# Patient Record
Sex: MALE | Race: OTHER | NOT HISPANIC OR LATINO | URBAN - METROPOLITAN AREA
[De-identification: names, ages, dates, MRNs, and addresses within clinical notes are randomized per-mention and may not be internally consistent; named-entity substitution may affect disease eponyms.]

---

## 2019-04-18 ENCOUNTER — OUTPATIENT (OUTPATIENT)
Dept: OUTPATIENT SERVICES | Facility: HOSPITAL | Age: 69
LOS: 1 days | End: 2019-04-18

## 2019-04-18 ENCOUNTER — APPOINTMENT (OUTPATIENT)
Age: 69
End: 2019-04-18

## 2019-04-18 ENCOUNTER — TRANSCRIPTION ENCOUNTER (OUTPATIENT)
Age: 69
End: 2019-04-18

## 2019-04-18 PROBLEM — Z00.00 ENCOUNTER FOR PREVENTIVE HEALTH EXAMINATION: Status: ACTIVE | Noted: 2019-04-18

## 2019-04-19 DIAGNOSIS — H40.003 PREGLAUCOMA, UNSPECIFIED, BILATERAL: ICD-10-CM

## 2019-04-19 DIAGNOSIS — H26.9 UNSPECIFIED CATARACT: ICD-10-CM

## 2019-08-13 ENCOUNTER — OUTPATIENT (OUTPATIENT)
Dept: OUTPATIENT SERVICES | Facility: HOSPITAL | Age: 69
LOS: 1 days | Discharge: HOME | End: 2019-08-13

## 2022-06-21 ENCOUNTER — APPOINTMENT (OUTPATIENT)
Dept: OPHTHALMOLOGY | Facility: CLINIC | Age: 72
End: 2022-06-21

## 2022-06-21 ENCOUNTER — OUTPATIENT (OUTPATIENT)
Dept: OUTPATIENT SERVICES | Facility: HOSPITAL | Age: 72
LOS: 1 days | End: 2022-06-21

## 2022-06-22 DIAGNOSIS — H40.009 PREGLAUCOMA, UNSPECIFIED, UNSPECIFIED EYE: ICD-10-CM

## 2023-07-06 ENCOUNTER — EMERGENCY (EMERGENCY)
Facility: HOSPITAL | Age: 73
LOS: 1 days | Discharge: HOME | End: 2023-07-06
Attending: STUDENT IN AN ORGANIZED HEALTH CARE EDUCATION/TRAINING PROGRAM | Admitting: STUDENT IN AN ORGANIZED HEALTH CARE EDUCATION/TRAINING PROGRAM
Payer: MEDICARE

## 2023-07-06 VITALS
RESPIRATION RATE: 18 BRPM | OXYGEN SATURATION: 97 % | TEMPERATURE: 98 F | HEIGHT: 76 IN | HEART RATE: 69 BPM | DIASTOLIC BLOOD PRESSURE: 94 MMHG | SYSTOLIC BLOOD PRESSURE: 146 MMHG | WEIGHT: 192.02 LBS

## 2023-07-06 VITALS
SYSTOLIC BLOOD PRESSURE: 152 MMHG | OXYGEN SATURATION: 98 % | HEART RATE: 73 BPM | TEMPERATURE: 98 F | DIASTOLIC BLOOD PRESSURE: 91 MMHG | RESPIRATION RATE: 18 BRPM
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
## 2023-07-06 DIAGNOSIS — S52.022A DISPLACED FRACTURE OF OLECRANON PROCESS WITHOUT INTRAARTICULAR EXTENSION OF LEFT ULNA, INITIAL ENCOUNTER FOR CLOSED FRACTURE: ICD-10-CM
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
## 2023-07-06 DIAGNOSIS — M25.422 EFFUSION, LEFT ELBOW: ICD-10-CM
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
## 2023-07-06 DIAGNOSIS — S01.81XA LACERATION WITHOUT FOREIGN BODY OF OTHER PART OF HEAD, INITIAL ENCOUNTER: ICD-10-CM

## 2023-07-06 DIAGNOSIS — M70.32 OTHER BURSITIS OF ELBOW, LEFT ELBOW: ICD-10-CM

## 2023-07-06 PROCEDURE — 73090 X-RAY EXAM OF FOREARM: CPT

## 2023-07-06 PROCEDURE — 99285 EMERGENCY DEPT VISIT HI MDM: CPT | Mod: 25

## 2023-07-06 PROCEDURE — 99284 EMERGENCY DEPT VISIT MOD MDM: CPT | Mod: 25

## 2023-07-06 PROCEDURE — 70450 CT HEAD/BRAIN W/O DYE: CPT | Mod: 26,MA

## 2023-07-06 PROCEDURE — 29105 APPLICATION LONG ARM SPLINT: CPT | Mod: LT,59

## 2023-07-06 PROCEDURE — 12011 RPR F/E/E/N/L/M 2.5 CM/<: CPT

## 2023-07-06 PROCEDURE — 99285 EMERGENCY DEPT VISIT HI MDM: CPT | Mod: 57

## 2023-07-06 PROCEDURE — 29105 APPLICATION LONG ARM SPLINT: CPT | Mod: LT

## 2023-07-06 PROCEDURE — 24670 CLTX ULNAR FX PROX W/O MNPJ: CPT | Mod: LT,54

## 2023-07-06 PROCEDURE — 73080 X-RAY EXAM OF ELBOW: CPT

## 2023-07-06 PROCEDURE — 70450 CT HEAD/BRAIN W/O DYE: CPT | Mod: MA

## 2023-07-06 PROCEDURE — 73090 X-RAY EXAM OF FOREARM: CPT | Mod: 26,LT

## 2023-07-06 PROCEDURE — 73080 X-RAY EXAM OF ELBOW: CPT | Mod: 26,LT,76

## 2023-07-06 RX ORDER — LIDOCAINE HYDROCHLORIDE AND EPINEPHRINE 10; 10 MG/ML; UG/ML
5 INJECTION, SOLUTION INFILTRATION; PERINEURAL ONCE
Refills: 0 | Status: COMPLETED | OUTPATIENT
Start: 2023-07-06 | End: 2023-07-06

## 2023-07-06 RX ADMIN — LIDOCAINE HYDROCHLORIDE AND EPINEPHRINE 5 MILLILITER(S): 10; 10 INJECTION, SOLUTION INFILTRATION; PERINEURAL at 10:04

## 2023-07-06 NOTE — ED ADULT NURSE NOTE - OBJECTIVE STATEMENT
Pt presents to ED from c/o mechanical fall this AM. Pt reports tripping, falling on his elbow first, then hitting his head on the sidewalk. Pt in mild pain 3/10.  Denies loc, h/a, dizziness, visual changes, n/v. Notes laceration to Left eyebrow. Bleeding controlled. Abrasion to Left elbow. Pt did not syncopated. PMH cataract surgery, prostate cancer. Pt aaox4, s/p mechanical fall this AM. Pt reports tripping, falling on his elbow first, then hitting his head on the sidewalk. Pt in mild pain 3/10.  Denies loc, h/a, dizziness, visual changes, n/v. Presents with laceration to left eyebrow, bleeding controlled, abrasion to Left elbow. Pt states did not syncopate. PMH cataract surgery, prostate cancer. Physician at bedside.

## 2023-07-06 NOTE — ED ADULT TRIAGE NOTE - CHIEF COMPLAINT QUOTE
Pt to ED c/o Mechanical fall this AM. Pt reports tripping and landing on street. Positive head stick. Denies loc, blood thinners, visual changes, n/v. Notes laceration to Left eyebrow. Bleeding controlled. Abrasion to Left elbow. Ambulates with a steady gait.

## 2023-07-06 NOTE — ED ADULT NURSE NOTE - NURSING NEURO LEVEL OF CONSCIOUSNESS
Tre is an otherwise healthy 13yo boy who presents today with one day of abdominal pain localized to the RLQ associated with nausea and decreased PO intake.  No diarrhea, no emesis. Afebrile. Abdominal ultrasound significant for a 1.4cm dilated appendiceal tip with possible perforation and small surrounding free fluid. Patient now s/p laparoscopic appendectomy for perforated appendicitis. Recovering well.     Plan:  - pain control: Tylenol/Toradol ATC, oxy prn  - IV abx: ceftriaxone/flagyl  - OOB  - regular diet  - d/c IVF  - PM CBC fine  - Possible d/c today    PEDS SURG  [id number]  Tre is an otherwise healthy 13yo boy who presents today with one day of abdominal pain localized to the RLQ associated with nausea and decreased PO intake.  No diarrhea, no emesis. Afebrile. Abdominal ultrasound significant for a 1.4cm dilated appendiceal tip with possible perforation and small surrounding free fluid. Patient now s/p laparoscopic appendectomy for perforated appendicitis. Recovering well.     Plan:  - pain control: Tylenol/Toradol ATC, oxy prn  - IV abx: ceftriaxone/flagyl  - OOB  - regular diet  - Possible d/c today    PEDS SURG  [id number]  alert and awake

## 2023-07-06 NOTE — ED PROVIDER NOTE - CLINICAL SUMMARY MEDICAL DECISION MAKING FREE TEXT BOX
Patient overall well-appearing, but with noted laceration above eyebrow.  Also high likelihood of elbow fracture.  Plan for lac  repair to face, local wound care to elbow.  CT head and x-rays of LUE  --> x-rays with fracture of the olecranon process, Ortho consulted to see patient in the ED.

## 2023-07-06 NOTE — CONSULT NOTE ADULT - SUBJECTIVE AND OBJECTIVE BOX
Orthopaedic Surgery Consult Note    For Surgeon: Dr. Gonzalez    HPI: 72M healthy (right hand dominant) with left olecranon fracture. Pt says [place] in the city this [place] and tripped over a piece of wood on the ground and fell on to his left arm primarily followed by his left forehead.     Denies LOC, headache, or new numbness or tingling in his left arm.   Reports no history of blood thinners.       Allergies: No Known Allergies  Intolerances      PAST MEDICAL & SURGICAL HISTORY:    MEDICATIONS  (STANDING):    MEDICATIONS  (PRN):      Vital Signs Last 24 Hrs  T(C): 36.7 (06 Jul 2023 11:40), Max: 36.8 (06 Jul 2023 08:57)  T(F): 98 (06 Jul 2023 11:40), Max: 98.2 (06 Jul 2023 08:57)  HR: 55 (06 Jul 2023 11:40) (55 - 69)  BP: 149/87 (06 Jul 2023 11:40) (146/94 - 149/87)  BP(mean): --  RR: 16 (06 Jul 2023 11:40) (16 - 18)  SpO2: 100% (06 Jul 2023 11:40) (97% - 100%)    Parameters below as of 06 Jul 2023 11:40  Patient On (Oxygen Delivery Method): room air        Physical Exam:  VS: stable, reviewed per nursing documentation  General: No acute distress, resting comfortably  Neuro: Alert, oriented x 3  Psych: Normal mood & affect  HEENT: laceration cleaned and stitched on left forehead  Neck: trachea midline  Respiratory: nonlabored on room air   CV: no cyanosis, no peripheral edema   GI: nondistended   Skin: + superficial abrasion of left elbow, Warm, dry, no rash, no lesions, no ecchymosis   MSK: atraumatic with exception of below  LUE     -Inspection/palpation: + superficial abrasion of left elbow, + tenderness to palpation over the  , + swelling over the, + deformity of the     -Compartments: soft, nontender bilaterally     -ROM: limited AROM secondary to pain of the , limited PROM secondary to pain of the     -Motor: triceps/biceps/ strength 5/5 bilateral upper extremities     -Quadriceps/TA/GS/EHL/FHL 5/5 bilateral lower extremities     -Sensation: intact to light touch bilateral upper extremities /  intact to light touch bilateral lower extremities    -Pulses: 2+ radial pulses bilaterally, symmetric; 2+ DP pulses bilaterally, symmetric, extremities warm and    well perfused, capillary refill brisk       Imaging:     A/P: 72yMale    -Discussed with Dr. Gonzalez    Ortho Pager [phone]   Orthopaedic Surgery Consult Note    For Surgeon: Dr. Gonzalez    HPI: 72M healthy (right hand dominant) with left olecranon fracture. Pt says he was walking in the city this morning and tripped over a piece of wood on the ground and fell on to his left arm primarily followed by his left forehead. Says he has minimal elbow pain but has some road rash. Reports he ambulates without the use of a cane or walker.   He says he lives in [place] and was walking to the [facility] when this incident occurred.   Denies LOC, headache, or new numbness or tingling in his left arm.   Reports no history of blood thinners.     Allergies: No Known Allergies  Intolerances    PAST MEDICAL & SURGICAL HISTORY:    MEDICATIONS  (STANDING):    MEDICATIONS  (PRN):    Vital Signs Last 24 Hrs  T(C): 36.7 (06 Jul 2023 11:40), Max: 36.8 (06 Jul 2023 08:57)  T(F): 98 (06 Jul 2023 11:40), Max: 98.2 (06 Jul 2023 08:57)  HR: 55 (06 Jul 2023 11:40) (55 - 69)  BP: 149/87 (06 Jul 2023 11:40) (146/94 - 149/87)  BP(mean): --  RR: 16 (06 Jul 2023 11:40) (16 - 18)  SpO2: 100% (06 Jul 2023 11:40) (97% - 100%)    Parameters below as of 06 Jul 2023 11:40  Patient On (Oxygen Delivery Method): room air    Physical Exam:  VS: stable, reviewed per nursing documentation  General: No acute distress, resting comfortably  Neuro: Alert, oriented x 3  Psych: Normal mood & affect  HEENT: laceration cleaned and stitched on left forehead  Neck: trachea midline  Respiratory: nonlabored on room air   CV: no cyanosis, no peripheral edema   GI: nondistended   Skin: + superficial abrasion of left elbow, Warm, dry, no rash, no lesions, no ecchymosis   MSK: atraumatic with exception of below  LUE     -Inspection/palpation: + superficial abrasion of left elbow, + mild tenderness to palpation over the L elbow  , + swelling over the L elbow, no gross deformity of the L elbow    -Compartments: soft, nontender bilaterally     -ROM: limited AROM secondary to pain of the L elbow, limited PROM secondary to pain of the L elbow    -Motor:  strength 5/5 L upper extremity    -Sensation: intact to light touch left upper extremity    -Pulses: 2+ radial pulses bilaterally, symmetric, warm and well perfused, capillary refill brisk     Imaging: pre-splint  Left elbow and forearm pain status post fall.  Lateral, AP, and oblique views of the left elbow. AP and lateral views left forearm.    FINDINGS:  Transverse fracture through the base of the olecranon process. There is 2   cm proximal distraction of the olecranon fracture fragment. No additional   acute fractures are identified. No dislocation. Mild cartilage space   narrowing of the left elbow joint. Small left elbow joint effusion.   Extensive edema in theolecranon bursa.    IMPRESSION:  1.  Displaced transverse fracture through the base of the olecranon   process of the proximal ulna.  2.  Posttraumatic olecranon bursitis.    Placed in posterior splint, pt tolerated procedure well.    A/P: 72yMale with L olecranon fracture   stable  pain management: as needed   WBS: LE CLAYTON in posterior splint  Recommended to follow up with Dr. Gonzalez outpatient tomorrow 7/7/23  Discussed history, physical exam, and plan with Dr. Gonzalez    Ortho Pager [phone] Orthopaedic Surgery Consult Note    For Surgeon: Dr. Gonzalez  Chief complaint: Left elbow pain     HPI: 72M healthy (right hand dominant) with left elbow pain. Pt says he was walking in the city this morning and tripped over a piece of wood on the ground and fell on to his left arm followed by his left forehead. Says he has minimal elbow pain but has some road rash. Reports he ambulates without the use of a cane or walker.   He says he lives in [place] and was walking to the [facility] when this incident occurred.   Denies LOC, headache, or new numbness or tingling in his left arm.   Reports no history of blood thinners.     Allergies: No Known Allergies  Intolerances    PAST MEDICAL & SURGICAL HISTORY:    MEDICATIONS  (STANDING):    MEDICATIONS  (PRN):    Vital Signs Last 24 Hrs  T(C): 36.7 (06 Jul 2023 11:40), Max: 36.8 (06 Jul 2023 08:57)  T(F): 98 (06 Jul 2023 11:40), Max: 98.2 (06 Jul 2023 08:57)  HR: 55 (06 Jul 2023 11:40) (55 - 69)  BP: 149/87 (06 Jul 2023 11:40) (146/94 - 149/87)  BP(mean): --  RR: 16 (06 Jul 2023 11:40) (16 - 18)  SpO2: 100% (06 Jul 2023 11:40) (97% - 100%)    Parameters below as of 06 Jul 2023 11:40  Patient On (Oxygen Delivery Method): room air    Physical Exam:  VS: stable, reviewed per nursing documentation  General: No acute distress, resting comfortably  Neuro: Alert, oriented x 3  Psych: Normal mood & affect  HEENT: laceration cleaned and stitched on left forehead with gauze/tape overlying  Neck: trachea midline  Respiratory: nonlabored on room air   CV: no cyanosis, no peripheral edema   GI: nondistended   Skin: + superficial abrasion of left elbow cleaned and dressed with xeroform, Warm, dry, no rash, no lesions, no ecchymosis   MSK: atraumatic with exception of below; secondary survey of the right upper extremity and cervical spine within normal limits  LUE     -Inspection/palpation: + superficial abrasion of left elbow, + mild tenderness to palpation over the left elbow  , + swelling at elbow diffused    -Compartments: soft, compressible, nontender bilaterally    -ROM: left elbow range of motion not tested secondary to known olecranon fracture    -Motor:  strength 5/5 left upper extremity    -Sensation: intact to light touch equal bilateral upper extremities    -Pulses: 2+ radial pulses bilaterally, symmetric, warm and well perfused, capillary refill brisk     Imaging: pre-splint  Left elbow and forearm pain status post fall.  Lateral, AP, and oblique views of the left elbow. AP and lateral views left forearm.    FINDINGS:  Transverse fracture through the base of the olecranon process. There is 2   cm proximal distraction of the olecranon fracture fragment. No additional   acute fractures are identified. No dislocation. Mild cartilage space   narrowing of the left elbow joint. Small left elbow joint effusion.   Extensive edema in theolecranon bursa.    IMPRESSION:  1.  Displaced transverse fracture through the base of the olecranon   process of the proximal ulna.  2.  Posttraumatic olecranon bursitis.    Placed in posterior splint, patient tolerated procedure well.    A/P: 72yMale with L olecranon fracture   stable  pain management: as needed   WBS: NWB MARCELLOE in posterior splint  Recommended to follow up with Dr. Gonzalez outpatient tomorrow 7/7/23  Discussed history, physical exam, and plan with Dr. Gonzalez    Ortho Pager [phone] Orthopaedic Surgery Consult Note    For Surgeon: Dr. Gonzalez  Chief complaint: Left elbow pain     HPI: 72M healthy (right hand dominant) with left elbow pain. Pt says he was walking in the city this morning and tripped over a piece of wood on the ground and fell on to his left arm followed by his left forehead. Says he has minimal elbow pain but has some road rash. Reports he ambulates without the use of a cane or walker.   He says he lives in [place] and was walking to the [facility] when this incident occurred.   Denies LOC, headache, or new numbness or tingling in his left arm.   Reports no history of blood thinners.     Allergies: No Known Allergies  Intolerances    PAST MEDICAL & SURGICAL HISTORY:    MEDICATIONS  (STANDING):    MEDICATIONS  (PRN):    Vital Signs Last 24 Hrs  T(C): 36.7 (06 Jul 2023 11:40), Max: 36.8 (06 Jul 2023 08:57)  T(F): 98 (06 Jul 2023 11:40), Max: 98.2 (06 Jul 2023 08:57)  HR: 55 (06 Jul 2023 11:40) (55 - 69)  BP: 149/87 (06 Jul 2023 11:40) (146/94 - 149/87)  BP(mean): --  RR: 16 (06 Jul 2023 11:40) (16 - 18)  SpO2: 100% (06 Jul 2023 11:40) (97% - 100%)    Parameters below as of 06 Jul 2023 11:40  Patient On (Oxygen Delivery Method): room air    Physical Exam:  VS: stable, reviewed per nursing documentation  General: No acute distress, resting comfortably  Neuro: Alert, oriented x 3  Psych: Normal mood & affect  HEENT: laceration cleaned and stitched on left forehead with gauze/tape overlying  Neck: trachea midline  Respiratory: nonlabored on room air   CV: no cyanosis, no peripheral edema   GI: nondistended   Skin: + superficial abrasion of left elbow cleaned and dressed with xeroform, Warm, dry, no rash, no lesions, no ecchymosis   MSK: atraumatic with exception of below; secondary survey of the right upper extremity and cervical spine within normal limits  LUE     -Inspection/palpation: + superficial abrasion of left elbow, + mild tenderness to palpation over the left elbow  , + swelling at elbow diffused    -Compartments: soft, compressible, nontender bilaterally    -ROM: left elbow range of motion not tested secondary to known olecranon fracture    -Motor:  strength 5/5 left upper extremity    -Sensation: intact to light touch equal bilateral upper extremities    -Pulses: 2+ radial pulses bilaterally, symmetric, warm and well perfused, capillary refill brisk     Imaging: pre-splint  Left elbow and forearm pain status post fall.  Lateral, AP, and oblique views of the left elbow. AP and lateral views left forearm.    FINDINGS:  Transverse fracture through the base of the olecranon process. There is 2   cm proximal distraction of the olecranon fracture fragment. No additional   acute fractures are identified. No dislocation. Mild cartilage space   narrowing of the left elbow joint. Small left elbow joint effusion.   Extensive edema in theolecranon bursa.    IMPRESSION:  1.  Displaced transverse fracture through the base of the olecranon   process of the proximal ulna.  2.  Posttraumatic olecranon bursitis.        A/P: 72yMale with L olecranon fracture s/p mechanical fall while walking this morning  stable  pain management: as needed   superficial abrasion of left elbow cleaned and dressed with xerofrom  Placed in posterior splint distal to wrist crease with about 60degree flexion, patient tolerated procedure well  WBS: NWB LUE in posterior splint  Post splint XR reviewed with Dr. Gonzalez, acceptable for discharge  splint care discussed, patient demonstrates understanding, importance of finger ROM emphasized   Recommended to follow up with Dr. Gonzalez outpatient tomorrow 7/7/23  Discussed history, physical exam, and plan with Dr. Gonzalez    Ortho Pager [phone]

## 2023-07-06 NOTE — ED PROVIDER NOTE - OBJECTIVE STATEMENT
72-year-old male functional independence, generally healthy, not on blood thinners comes in for evaluation s/p fall.  Patient was walking on the sidewalk and tripped over a piece of wood outside of scaffolding, primarily landed on his left elbow, followed by left forehead.  No LOC.  Was able to stand up and walk afterwards.  Notes swelling and road rash to left elbow, as well as laceration above left eyebrow.  Patient says Tdap  is up-to-date.

## 2023-07-06 NOTE — ED PROVIDER NOTE - CARE PLAN
1 Principal Discharge DX:	Fall  Secondary Diagnosis:	Fracture of left olecranon process  Secondary Diagnosis:	Traumatic bursitis  Secondary Diagnosis:	Laceration of left eyebrow

## 2023-07-06 NOTE — ED ADULT NURSE NOTE - NSFALLRISKINTERV_ED_ALL_ED

## 2023-07-06 NOTE — ED PROVIDER NOTE - NSFOLLOWUPINSTRUCTIONS_ED_ALL_ED_FT
Olecranon Fracture    An olecranon fracture is a break in one of the bones of your elbow. Three bones make up your elbow. These include the two bones of your lower arm (ulna and radius) and the single bone of your upper arm (humerus). The tip of your elbow (olecranon) is part of your ulna. You can feel this hard tip when you bend your elbow. It is easily injured because it has very little padding over it.    What are the causes?  Anatomy of the arm and shoulder showing the tip of the ulna, or the olecranon, in the elbow.  Olecranon fractures commonly occur after falling on a hard surface with a bent elbow. Other causes include:  A forceful hit to the elbow.  A motor vehicle collision.  Falling onto an outstretched arm.  A forceful twist to the elbow.  What increases the risk?  You are more likely to develop this condition if you:  Participate in contact sports or sports in which falls on hard surfaces are common.  Are older. This is when falls are more common.  Have thin or weak bones.  What are the signs or symptoms?  Symptoms of this condition include:  Severe pain, especially when you try to move your elbow.  Swelling.  Bruising.  Stiffness.  Pain or tenderness in your elbow when it is touched.  Numbness in your fingers.  How is this diagnosed?  This condition is diagnosed based on a physical exam and X-rays.    How is this treated?  Treatment depends on the severity and location of the fracture. Many fractures can be treated without surgery. Treatment may include:  Taking pain medicine.  Icing the injury to reduce swelling.  Keeping the elbow still with a cast, splint, or sling (immobilization).  Doing physical therapy to restore movement.  If the elbow is not stable or if bones are out of place, you may need surgery. You may have pins, wires, screws, or plates inserted into the broken bone. Then, you will need to wear a splint, cast, or sling and have physical therapy to restore movement.    Follow these instructions at home:  Medicines    Take over-the-counter and prescription medicines only as told by your health care provider.  Ask your health care provider if the medicine prescribed to you:  Requires you to avoid driving or using heavy machinery.  Can cause constipation. You may need to take actions to prevent or treat constipation, such as:  Drink enough fluid to keep your urine pale yellow.  Take over-the-counter or prescription medicines.  Eat foods that are high in fiber, such as beans, whole grains, and fresh fruits and vegetables.  Limit foods that are high in fat and processed sugars, such as fried or sweet foods.  If you have a splint or sling:    Wear the splint or sling as told by your health care provider. Remove it only as told by your health care provider.  Loosen the splint or sling if your fingers tingle, become numb, or turn cold and blue.  Keep the splint or sling clean and dry.  If you have a cast:    Do not put pressure on any part of the cast until it is fully hardened. This may take several hours.  Do not stick anything inside the cast to scratch your skin. Doing that increases your risk of infection.  Check the skin around the cast every day. Tell your health care provider about any concerns.  You may put lotion on dry skin around the edges of the cast. Do not put lotion on the skin underneath the cast.  Keep the cast clean and dry.  Bathing    Do not take baths, swim, or use a hot tub until your health care provider approves. Ask your health care provider if you may take showers. You may only be allowed to take sponge baths.  If the cast, splint, or sling is not waterproof:  Do not let it get wet.  Cover it with a waterproof covering when you take a bath or shower.  Managing pain, stiffness, and swelling    Bag of ice on a towel on the skin.  If directed, put ice on the injured area.  If you have a removable splint or sling, remove it as told by your health care provider.  Put ice in a plastic bag.  Place a towel between your skin and the bag or between your cast and the bag.  Leave the ice on for 20 minutes, 2–3 times a day.  Move your fingers often to reduce stiffness and swelling.  Raise (elevate) the injured area above the level of your heart while you are sitting or lying down.  Activity    Return to your normal activities as told by your health care provider. Ask your health care provider what activities are safe for you. You may not be able to lift anything with your arm for several weeks.  Do exercises as told by your health care provider or physical therapist.  General instructions    Do not use any products that contain nicotine or tobacco, such as cigarettes, e-cigarettes, and chewing tobacco. These can delay bone healing. If you need help quitting, ask your health care provider.  Ask your health care provider when it is safe to drive if you have a cast, splint, or sling on your arm.  Keep all follow-up visits as told by your health care provider. This is important.  Contact a health care provider if:  You have pain that gets worse.  Your hand and fingers swell.  Your cast or splint becomes loose or damaged.  Get help right away if:  You lose feeling in your hand or fingers.  Your hand or fingers get cold or turn pale or blue.  Summary  An olecranon fracture is a break in one of the bones of your elbow.  Olecranon fractures commonly occur after falling on a hard surface with a bent elbow.  Treatment depends on the severity and location of the fracture.  It is treated with ice, pain medicine, keeping the elbow still (immobilization), physical therapy, and surgery if needed.  This information is not intended to replace advice given to you by your health care provider. Make sure you discuss any questions you have with your health care provider.    Document Revised: 03/17/2022 Document Reviewed: 03/17/2022  Elsevier Patient Education © 2023 [employer] You have an olecranon fracture (elbow). You should not bear any weight in that arm. You need to follow up with Dr Gonzalez from orthopedics TOMORROW 7/7 in the office at [facility]. Please call [phone] immediately after discharge to schedule your appointment time    Olecranon Fracture    An olecranon fracture is a break in one of the bones of your elbow. Three bones make up your elbow. These include the two bones of your lower arm (ulna and radius) and the single bone of your upper arm (humerus). The tip of your elbow (olecranon) is part of your ulna. You can feel this hard tip when you bend your elbow. It is easily injured because it has very little padding over it.    What are the causes?  Anatomy of the arm and shoulder showing the tip of the ulna, or the olecranon, in the elbow.  Olecranon fractures commonly occur after falling on a hard surface with a bent elbow. Other causes include:  A forceful hit to the elbow.  A motor vehicle collision.  Falling onto an outstretched arm.  A forceful twist to the elbow.  What increases the risk?  You are more likely to develop this condition if you:  Participate in contact sports or sports in which falls on hard surfaces are common.  Are older. This is when falls are more common.  Have thin or weak bones.  What are the signs or symptoms?  Symptoms of this condition include:  Severe pain, especially when you try to move your elbow.  Swelling.  Bruising.  Stiffness.  Pain or tenderness in your elbow when it is touched.  Numbness in your fingers.  How is this diagnosed?  This condition is diagnosed based on a physical exam and X-rays.    How is this treated?  Treatment depends on the severity and location of the fracture. Many fractures can be treated without surgery. Treatment may include:  Taking pain medicine.  Icing the injury to reduce swelling.  Keeping the elbow still with a cast, splint, or sling (immobilization).  Doing physical therapy to restore movement.  If the elbow is not stable or if bones are out of place, you may need surgery. You may have pins, wires, screws, or plates inserted into the broken bone. Then, you will need to wear a splint, cast, or sling and have physical therapy to restore movement.    Follow these instructions at home:  Medicines    Take over-the-counter and prescription medicines only as told by your health care provider.  Ask your health care provider if the medicine prescribed to you:  Requires you to avoid driving or using heavy machinery.  Can cause constipation. You may need to take actions to prevent or treat constipation, such as:  Drink enough fluid to keep your urine pale yellow.  Take over-the-counter or prescription medicines.  Eat foods that are high in fiber, such as beans, whole grains, and fresh fruits and vegetables.  Limit foods that are high in fat and processed sugars, such as fried or sweet foods.  If you have a splint or sling:    Wear the splint or sling as told by your health care provider. Remove it only as told by your health care provider.  Loosen the splint or sling if your fingers tingle, become numb, or turn cold and blue.  Keep the splint or sling clean and dry.  If you have a cast:    Do not put pressure on any part of the cast until it is fully hardened. This may take several hours.  Do not stick anything inside the cast to scratch your skin. Doing that increases your risk of infection.  Check the skin around the cast every day. Tell your health care provider about any concerns.  You may put lotion on dry skin around the edges of the cast. Do not put lotion on the skin underneath the cast.  Keep the cast clean and dry.  Bathing    Do not take baths, swim, or use a hot tub until your health care provider approves. Ask your health care provider if you may take showers. You may only be allowed to take sponge baths.  If the cast, splint, or sling is not waterproof:  Do not let it get wet.  Cover it with a waterproof covering when you take a bath or shower.  Managing pain, stiffness, and swelling    Bag of ice on a towel on the skin.  If directed, put ice on the injured area.  If you have a removable splint or sling, remove it as told by your health care provider.  Put ice in a plastic bag.  Place a towel between your skin and the bag or between your cast and the bag.  Leave the ice on for 20 minutes, 2–3 times a day.  Move your fingers often to reduce stiffness and swelling.  Raise (elevate) the injured area above the level of your heart while you are sitting or lying down.  Activity    Return to your normal activities as told by your health care provider. Ask your health care provider what activities are safe for you. You may not be able to lift anything with your arm for several weeks.  Do exercises as told by your health care provider or physical therapist.  General instructions    Do not use any products that contain nicotine or tobacco, such as cigarettes, e-cigarettes, and chewing tobacco. These can delay bone healing. If you need help quitting, ask your health care provider.  Ask your health care provider when it is safe to drive if you have a cast, splint, or sling on your arm.  Keep all follow-up visits as told by your health care provider. This is important.  Contact a health care provider if:  You have pain that gets worse.  Your hand and fingers swell.  Your cast or splint becomes loose or damaged.  Get help right away if:  You lose feeling in your hand or fingers.  Your hand or fingers get cold or turn pale or blue.  Summary  An olecranon fracture is a break in one of the bones of your elbow.  Olecranon fractures commonly occur after falling on a hard surface with a bent elbow.  Treatment depends on the severity and location of the fracture.  It is treated with ice, pain medicine, keeping the elbow still (immobilization), physical therapy, and surgery if needed.  This information is not intended to replace advice given to you by your health care provider. Make sure you discuss any questions you have with your health care provider.    Document Revised: 03/17/2022 Document Reviewed: 03/17/2022  Elsevier Patient Education © 2023 [employer]

## 2023-07-06 NOTE — ED PROVIDER NOTE - PHYSICAL EXAMINATION
CONST: nontoxic NAD speaking in full sentences  HEAD: atraumatic,  No hematomas  EYES: conjunctivae clear, PERRL, EOMI  ENT:  +1 cm laceration above left eyebrow  NECK: supple/FROM, nttp  CARD: rrr   CHEST: ctab no r/r/w, no stridor/retractions/tripoding  ABD: soft, nd, nttp, no rebound/guarding  EXT:  left elbow with large area abrasions/road rash, no laceration, + marked swelling clinically bursitis, patient able to flex/extend/pronate/supinate.  2+ radial pulse, NVI distally  SKIN: warm, dry, no rash, no pedal edema/ttp/rash, cap refill <2sec  NEURO: awake alert answering questions following commands moving all extremities  normal gait

## 2023-07-06 NOTE — ED PROVIDER NOTE - PATIENT PORTAL LINK FT
You can access the FollowMyHealth Patient Portal offered by [facility] by registering at the following website: [email or web address]. By joining [employer]’s FollowMyHealth portal, you will also be able to view your health information using other applications (apps) compatible with our system.

## 2023-10-11 ENCOUNTER — APPOINTMENT (OUTPATIENT)
Dept: OPHTHALMOLOGY | Facility: CLINIC | Age: 73
End: 2023-10-11

## 2023-10-11 ENCOUNTER — OUTPATIENT (OUTPATIENT)
Dept: OUTPATIENT SERVICES | Facility: HOSPITAL | Age: 73
LOS: 1 days | End: 2023-10-11

## 2023-10-13 DIAGNOSIS — H26.492 OTHER SECONDARY CATARACT, LEFT EYE: ICD-10-CM
